# Patient Record
Sex: MALE | Race: WHITE | NOT HISPANIC OR LATINO | URBAN - METROPOLITAN AREA
[De-identification: names, ages, dates, MRNs, and addresses within clinical notes are randomized per-mention and may not be internally consistent; named-entity substitution may affect disease eponyms.]

---

## 2023-01-22 ENCOUNTER — EMERGENCY (EMERGENCY)
Facility: HOSPITAL | Age: 38
LOS: 1 days | Discharge: HOME | End: 2023-01-22
Attending: EMERGENCY MEDICINE | Admitting: EMERGENCY MEDICINE
Payer: MEDICAID

## 2023-01-22 VITALS
TEMPERATURE: 99 F | HEIGHT: 73 IN | SYSTOLIC BLOOD PRESSURE: 137 MMHG | WEIGHT: 190.04 LBS | RESPIRATION RATE: 18 BRPM | HEART RATE: 72 BPM | OXYGEN SATURATION: 99 % | DIASTOLIC BLOOD PRESSURE: 90 MMHG

## 2023-01-22 LAB
ALBUMIN SERPL ELPH-MCNC: 4.2 G/DL — SIGNIFICANT CHANGE UP (ref 3.4–5)
ALP SERPL-CCNC: 75 U/L — SIGNIFICANT CHANGE UP (ref 40–120)
ALT FLD-CCNC: 20 U/L — SIGNIFICANT CHANGE UP (ref 12–42)
ANION GAP SERPL CALC-SCNC: 9 MMOL/L — SIGNIFICANT CHANGE UP (ref 9–16)
AST SERPL-CCNC: 27 U/L — SIGNIFICANT CHANGE UP (ref 15–37)
BASOPHILS # BLD AUTO: 0.07 K/UL — SIGNIFICANT CHANGE UP (ref 0–0.2)
BASOPHILS NFR BLD AUTO: 0.6 % — SIGNIFICANT CHANGE UP (ref 0–2)
BILIRUB SERPL-MCNC: 0.3 MG/DL — SIGNIFICANT CHANGE UP (ref 0.2–1.2)
BUN SERPL-MCNC: 16 MG/DL — SIGNIFICANT CHANGE UP (ref 7–23)
CALCIUM SERPL-MCNC: 9.5 MG/DL — SIGNIFICANT CHANGE UP (ref 8.5–10.5)
CHLORIDE SERPL-SCNC: 109 MMOL/L — HIGH (ref 96–108)
CO2 SERPL-SCNC: 25 MMOL/L — SIGNIFICANT CHANGE UP (ref 22–31)
CREAT SERPL-MCNC: 1.3 MG/DL — SIGNIFICANT CHANGE UP (ref 0.5–1.3)
EGFR: 73 ML/MIN/1.73M2 — SIGNIFICANT CHANGE UP
EOSINOPHIL # BLD AUTO: 0.07 K/UL — SIGNIFICANT CHANGE UP (ref 0–0.5)
EOSINOPHIL NFR BLD AUTO: 0.6 % — SIGNIFICANT CHANGE UP (ref 0–6)
GLUCOSE SERPL-MCNC: 107 MG/DL — HIGH (ref 70–99)
HCT VFR BLD CALC: 46.7 % — SIGNIFICANT CHANGE UP (ref 39–50)
HGB BLD-MCNC: 15.8 G/DL — SIGNIFICANT CHANGE UP (ref 13–17)
HIV 1 & 2 AB SERPL IA.RAPID: SIGNIFICANT CHANGE UP
IMM GRANULOCYTES NFR BLD AUTO: 0.7 % — SIGNIFICANT CHANGE UP (ref 0–0.9)
LYMPHOCYTES # BLD AUTO: 1.96 K/UL — SIGNIFICANT CHANGE UP (ref 1–3.3)
LYMPHOCYTES # BLD AUTO: 16.3 % — SIGNIFICANT CHANGE UP (ref 13–44)
MAGNESIUM SERPL-MCNC: 2.1 MG/DL — SIGNIFICANT CHANGE UP (ref 1.6–2.6)
MCHC RBC-ENTMCNC: 31.3 PG — SIGNIFICANT CHANGE UP (ref 27–34)
MCHC RBC-ENTMCNC: 33.8 GM/DL — SIGNIFICANT CHANGE UP (ref 32–36)
MCV RBC AUTO: 92.7 FL — SIGNIFICANT CHANGE UP (ref 80–100)
MONOCYTES # BLD AUTO: 1.02 K/UL — HIGH (ref 0–0.9)
MONOCYTES NFR BLD AUTO: 8.5 % — SIGNIFICANT CHANGE UP (ref 2–14)
NEUTROPHILS # BLD AUTO: 8.79 K/UL — HIGH (ref 1.8–7.4)
NEUTROPHILS NFR BLD AUTO: 73.3 % — SIGNIFICANT CHANGE UP (ref 43–77)
NRBC # BLD: 0 /100 WBCS — SIGNIFICANT CHANGE UP (ref 0–0)
PLATELET # BLD AUTO: 354 K/UL — SIGNIFICANT CHANGE UP (ref 150–400)
POTASSIUM SERPL-MCNC: 3.9 MMOL/L — SIGNIFICANT CHANGE UP (ref 3.5–5.3)
POTASSIUM SERPL-SCNC: 3.9 MMOL/L — SIGNIFICANT CHANGE UP (ref 3.5–5.3)
PROT SERPL-MCNC: 8.1 G/DL — SIGNIFICANT CHANGE UP (ref 6.4–8.2)
RBC # BLD: 5.04 M/UL — SIGNIFICANT CHANGE UP (ref 4.2–5.8)
RBC # FLD: 12.2 % — SIGNIFICANT CHANGE UP (ref 10.3–14.5)
SODIUM SERPL-SCNC: 143 MMOL/L — SIGNIFICANT CHANGE UP (ref 132–145)
TSH SERPL-MCNC: 0.81 UIU/ML — SIGNIFICANT CHANGE UP (ref 0.36–3.74)
WBC # BLD: 11.99 K/UL — HIGH (ref 3.8–10.5)
WBC # FLD AUTO: 11.99 K/UL — HIGH (ref 3.8–10.5)

## 2023-01-22 PROCEDURE — 99284 EMERGENCY DEPT VISIT MOD MDM: CPT

## 2023-01-22 NOTE — ED ADULT NURSE NOTE - CAS ELECT INFOMATION PROVIDED
Patient cleared for discharge by provider. RN discussed discharge instructions with patient using the teach-back method. Patient asked questions and verbalized understanding.

## 2023-01-22 NOTE — ED ADULT TRIAGE NOTE - CHIEF COMPLAINT QUOTE
Pt sent from  for further eval of episode of neck and facial tremor/spasm. BEFAST negative in triage.

## 2023-01-22 NOTE — ED PROVIDER NOTE - PATIENT PORTAL LINK FT
You can access the FollowMyHealth Patient Portal offered by [facility] by registering at the following website: [email or web address]. By joining [employer]’s FollowMyHealth portal, you will also be able to view your health information using other applications (apps) compatible with our system.

## 2023-01-22 NOTE — ED PROVIDER NOTE - PROVIDER TOKENS
PROVIDER:[TOKEN:[[id number]],FOLLOWUP:[Routine]] PROVIDER:[TOKEN:[[id number]],FOLLOWUP:[Routine]],PROVIDER:[TOKEN:[[id number]],FOLLOWUP:[Routine]]

## 2023-01-22 NOTE — ED ADULT NURSE NOTE - OBJECTIVE STATEMENT
Patient presents to the ED c/o facial twitching. Patient is currently resting in NAD. Denies any needs at this time. Will continue to monitor in the ED.

## 2023-01-22 NOTE — ED PROVIDER NOTE - NSFOLLOWUPINSTRUCTIONS_ED_ALL_ED_FT
Please read all handouts provided to you in the emergency department.  Seek immediate medical attention for any new/worsening signs or symptoms.  Please follow-up with the primary care and neurology physicians provided to you from the emergency department.    [facility]:  Address: [address] [place] [address]  Phone: [phone]      Myoclonus    Myoclonus is the sudden and quick movement of the muscles. It may include muscle jerks, twitches, or spasms that happen without a person's control (involuntary). There are different types of myoclonus. One type, called physiologic myoclonus, occurs normally in most people and rarely needs treatment. This type includes:  •Hiccups.      •Sleep starts, or twitching during sleep.      Other types are caused by an underlying condition and may require treatment.      What are the causes?    The cause of this condition varies depending on the type of myoclonus. Physiologic myoclonus, such as hiccups, results from normal actions of the body. Other causes include:  •Genetic condition or disease. This causes a type of myoclonus called essential myoclonus.      •Epilepsy. This causes epileptic myoclonus.    •Other underlying conditions cause secondary or symptomatic myoclonus. These include:  •Side effects of certain medicines.      •Metabolic conditions.      •Head, brain, or spinal injuries.      •Stroke.      •Nervous system conditions such as dementia, Parkinson's disease, and Alzheimer's disease.      •Infections.      •Poisoning.      •Brain tumors.          What are the signs or symptoms?    The main symptom of this condition is sudden spasms, jerking, or uncontrollable movements. Symptoms may:  •Occur in only one area of the body or over the entire body.      •Come and go.      •Vary in intensity.      •Make it hard for you to eat, speak, or walk.        How is this diagnosed?  A person entering an MRI machine.   This condition is diagnosed based on your medical history, a review of your symptoms, and a physical exam.    You may also have tests, including:  •Electroencephalogram (EEG). This test checks the electrical activity of the brain.      •Electromyogram (EMG). This test measures electrical activity in the muscles.      •Imaging tests such as an MRI or CT scan.      •Lumbar puncture (spinal tap) to check spinal fluid for infection or inflammation.      •Blood tests.      •Urine tests.        How is this treated?    Treatment for this condition depends on the underlying condition and the severity of your symptoms. Treatment may include:  •Medicines, such as tranquilizer and anti-seizure medicines.      •Treating the underlying cause, such as having a tumor removed or taking antibiotic medicine for an infection.      •Injections of a substance called botulinum toxin.      Physiologic myoclonus does not require treatment.      Follow these instructions at home:    •Take over-the-counter and prescription medicines only as told by your health care provider.      •If you are taking tranquilizer or anti-seizure medicines, do not drive or use machinery until your body adjusts to the medicine. These medicines may cause drowsiness.      •Keep a journal of your symptoms and the things that seem to trigger them or make them worse. Also, try to identity the things that make them better. Share this information with your health care provider.      •Keep all follow-up visits. This is important.        Where to find more information    •[employer] [place] [employer] of Neurological Disorders and Stroke: www.ninds.nih.gov        Contact a health care provider if:    •You have severe pain and medicines do not help.      •You have problems taking your medicines.      •Your twitches, jerks, or spasms get worse.        Get help right away if:    •You have a seizure.      •You have a reaction to your medicines, such as a rash, trouble breathing, or swelling.      •You have trouble staying alert.      These symptoms may be an emergency. Get help right away. Call 911.   • Do not wait to see if the symptoms will go away.        •  Do not drive yourself to the hospital.         Summary    •Myoclonus is the sudden and quick movement of the muscles. It may include muscle jerks, twitches, or spasms that happen without a person's control (involuntary).      •There are different types of myoclonus. Physiologic myoclonus occurs normally in most people and rarely needs treatment. Other types of myoclonus are caused by an underlying condition.      •Treatment for this condition depends on the underlying condition and the severity of your symptoms.      •If you are taking tranquilizer or anti-seizure medicines, do not drive or use machinery until your body adjusts to the medicine. These medicines may cause drowsiness.      This information is not intended to replace advice given to you by your health care provider. Make sure you discuss any questions you have with your health care provider.      Spasmodic Torticollis    WHAT YOU NEED TO KNOW:    Spasmodic torticollis, also called cervical dystonia, is a condition that causes your neck muscles to contract abnormally. Your neck may twist and cause your head to tilt to one side, forward, or backward. Spasmodic torticollis may occur occasionally or continuously. It often gets worse with stress.    DISCHARGE INSTRUCTIONS:    Medicines: You may need any of the following:   •Muscle relaxers decrease pain and muscle spasms.      •Botulinum toxin injections may also be given to relax your muscles.      •NSAIDs, such as ibuprofen, help decrease swelling, pain, and fever. This medicine is available with or without a doctor's order. NSAIDs can cause stomach bleeding or kidney problems in certain people. If you take blood thinner medicine, always ask if NSAIDs are safe for you. Always read the medicine label and follow directions. Do not give these medicines to children younger than 6 months without direction from a healthcare provider.      •Acetaminophen decreases pain. It is available without a doctor's order. Ask how much to take and how often to take it. Follow directions. Acetaminophen can cause liver damage if not taken correctly.      •Prescription pain medicine may be given. Ask your healthcare provider how to take this medicine safely.      •Take your medicine as directed. Contact your healthcare provider if you think your medicine is not helping or if you have side effects. Tell him if you are allergic to any medicine. Keep a list of the medicines, vitamins, and herbs you take. Include the amounts, and when and why you take them. Bring the list or the pill bottles to follow-up visits. Carry your medicine list with you in case of an emergency.      Follow up with your healthcare provider as directed: Write down your questions so you remember to ask them during your visits.    Self-care:   •Apply ice on your neck for 15 to 20 minutes every hour or as directed. Use an ice pack, or put crushed ice in a plastic bag. Cover it with a towel. Ice helps prevent tissue damage and decreases swelling and pain.      •Apply heat on your neck for 20 to 30 minutes every 2 hours for as many days as directed. Heat helps decrease pain and muscle spasms.      •Rest as needed. Return to your daily activities as directed.       •Use cervical collar as directed. A cervical collar may be needed to support your neck.       Contact your healthcare provider if:   •You have a fever.       •You have swelling in your neck area that gets worse or does not go away.      •You have an increased feeling of sadness or loneliness, or you feel depressed.      •You have questions or concerns about your condition or care.      Return to the emergency department if:   •You have increased pain in your neck or shoulder.      •You have sudden shortness of breath.       •You have trouble moving your arms or legs.      •Your arms or legs feel numb.

## 2023-01-22 NOTE — ED PROVIDER NOTE - CARE PROVIDER_API CALL
Emil Flanagan)  Neurology; Neuromuscular Medicine  [address]  [place], [place] [address]  Phone: [phone]  Fax: [phone]  Follow Up Time: Routine   Emil Flanagan)  Neurology; Neuromuscular Medicine  [address] [place] [address]  Phone: [phone]  Fax: [phone]  Follow Up Time: Routine    Rhett Liriano)  Internal Medicine  [facility] [place], [place] [id number]  Phone: [phone]  Fax: [phone]  Follow Up Time: Routine

## 2023-01-22 NOTE — ED PROVIDER NOTE - CARE PROVIDERS DIRECT ADDRESSES
[email or web address]

## 2023-01-22 NOTE — ED PROVIDER NOTE - CLINICAL SUMMARY MEDICAL DECISION MAKING FREE TEXT BOX
38-year-old male patient no past medical history presented with 1 hour of right-sided facial twitching and neck twitching that began this afternoon.  Patient has had episodes such as this in the past but not lasting as long as today's.  Went to urgent care were the episodes were witnessed by a physician there who recommended that he go to the emergency department for further evaluation.  Here there are no signs of twitching and his neuro exam is grossly intact.  Patient does report recent cocaine use.  He does not report any family history of any neurological disease.  No infectious symptoms in the past week, but did have 3 days of diarrhea 2 weeks ago.  Will assess for metabolic derangements by obtaining labs and if, DC to primary care and neurology follow-up.  Patient understands plan and amenable to this work-up.

## 2023-01-22 NOTE — ED PROVIDER NOTE - CARE PLAN
Principal Discharge DX:	Facial twitching  Assessment and plan of treatment:	Assist patient with follow-up with primary care and neurology.   1

## 2023-01-22 NOTE — ED PROVIDER NOTE - OBJECTIVE STATEMENT
38-year-old male patient no past medical history presented with 1 hour of right-sided facial twitching and neck twitching that began this afternoon.  Patient has had episodes such as this in the past but not lasting as long as today's.  Went to urgent care were the episodes were witnessed by a physician there who recommended that he go to the emergency department for further evaluation.  Here there are no signs of twitching and his neuro exam is grossly intact.  Patient does report recent cocaine use.  He does not report any family history of any neurological disease.  No infectious symptoms in the past week, but did have 3 days of diarrhea 2 weeks ago.

## 2023-01-23 PROBLEM — Z00.00 ENCOUNTER FOR PREVENTIVE HEALTH EXAMINATION: Status: ACTIVE | Noted: 2023-01-23

## 2023-01-24 DIAGNOSIS — G51.4 FACIAL MYOKYMIA: ICD-10-CM

## 2023-01-24 LAB — CA-I BLD-SCNC: 5.2 MG/DL — SIGNIFICANT CHANGE UP (ref 4.5–5.6)
